# Patient Record
Sex: FEMALE | Race: WHITE | NOT HISPANIC OR LATINO | Employment: FULL TIME | URBAN - METROPOLITAN AREA
[De-identification: names, ages, dates, MRNs, and addresses within clinical notes are randomized per-mention and may not be internally consistent; named-entity substitution may affect disease eponyms.]

---

## 2020-02-17 ENCOUNTER — CONSULT (OUTPATIENT)
Dept: GASTROENTEROLOGY | Facility: CLINIC | Age: 35
End: 2020-02-17
Payer: COMMERCIAL

## 2020-02-17 VITALS
DIASTOLIC BLOOD PRESSURE: 88 MMHG | HEART RATE: 84 BPM | SYSTOLIC BLOOD PRESSURE: 128 MMHG | WEIGHT: 134 LBS | BODY MASS INDEX: 23.74 KG/M2 | HEIGHT: 63 IN

## 2020-02-17 DIAGNOSIS — K21.9 GASTROESOPHAGEAL REFLUX DISEASE, ESOPHAGITIS PRESENCE NOT SPECIFIED: ICD-10-CM

## 2020-02-17 DIAGNOSIS — R19.4 CHANGE IN BOWEL HABITS: Primary | ICD-10-CM

## 2020-02-17 DIAGNOSIS — K58.1 IRRITABLE BOWEL SYNDROME WITH CONSTIPATION: ICD-10-CM

## 2020-02-17 PROCEDURE — 99244 OFF/OP CNSLTJ NEW/EST MOD 40: CPT | Performed by: INTERNAL MEDICINE

## 2020-02-17 RX ORDER — DULOXETIN HYDROCHLORIDE 60 MG/1
1 CAPSULE, DELAYED RELEASE ORAL DAILY
COMMUNITY
Start: 2020-01-14 | End: 2020-04-27

## 2020-02-17 RX ORDER — ESCITALOPRAM OXALATE 10 MG/1
1 TABLET ORAL DAILY
COMMUNITY
Start: 2020-01-17

## 2020-02-17 RX ORDER — DICYCLOMINE HYDROCHLORIDE 10 MG/1
1 CAPSULE ORAL AS NEEDED
COMMUNITY
Start: 2020-01-24 | End: 2020-02-17

## 2020-02-17 RX ORDER — DICYCLOMINE HYDROCHLORIDE 10 MG/1
20 CAPSULE ORAL
Qty: 90 CAPSULE | Refills: 5 | Status: SHIPPED | OUTPATIENT
Start: 2020-02-17 | End: 2020-03-23

## 2020-02-17 RX ORDER — HYOSCYAMINE SULFATE 0.125 MG
0.12 TABLET ORAL EVERY 4 HOURS PRN
COMMUNITY

## 2020-02-17 RX ORDER — LANSOPRAZOLE 30 MG/1
30 CAPSULE, DELAYED RELEASE ORAL DAILY
COMMUNITY
End: 2020-04-27

## 2020-02-17 NOTE — PATIENT INSTRUCTIONS
[id number] mg 3 times a day  Continue Prevacid  Okay to use Levsin as needed  Blood work and stool studies as ordered    Repeat office visit 6-8 weeks

## 2020-02-17 NOTE — LETTER
February 17, 2020     Haile Manual, DO  [address] [facility] [address]    Patient: Veronica Cody   YOB: 1985   Date of Visit: 2/17/2020       Dear Dr Cristina Mon: Thank you for referring Starr Gong to me for evaluation  Below are my notes for this consultation  If you have questions, please do not hesitate to call me  I look forward to following your patient along with you  Sincerely,        Ap Christopher MD        CC: No Recipients  Ap Christopher MD  2/17/2020  1:58 PM  Sign at close encounter  [address] [facility] - Outpatient Consultation  Veronica Cody 37 y o  male MRN: [id number]  Encounter: [id number]          ASSESSMENT AND PLAN:      1  Change in bowel habits  2  Irritable bowel syndrome with constipation  Patient with chronic constipation predominant IBS who is now having diarrhea  Suspect secondary to Cymbalta but would like to continue this medication since this seems to be helping with her anxiety    - dicyclomine (BENTYL) 10 mg capsule; Take 2 capsules (20 mg total) by mouth 3 (three) times a day before meals  Dispense: 90 capsule; Refill: 5  - okay to use Levsin and Imodium as needed  - if no better we will need colonoscopy to exclude inflammatory bowel disease  - Calprotectin,Fecal; Future  - Clostridium difficile toxin by PCR with EIA; Future  - Giardia antigen; Future  - MISCELLANEOUS LAB TEST; Future  - Comprehensive metabolic panel; Future  - CBC; Future  - Sedimentation rate, automated; Future  - Fecal leukocytes; Future        3  Gastroesophageal reflux disease, esophagitis presence not specified  On lansoprazole    ______________________________________________________________________    HPI:  The patient is seen in the office today per the request of her PCP for evaluation of irritable bowel syndrome and change in bowel habits    She has long history of constipation predominant IBS and will move her bowels once every 1-2 weeks  About 3 months ago began having increased symptoms  She was seen by a colorectal surgeon started on MiraLax  She continued to take Ex-Lax as well  She reports beginning to have diarrhea at that point time  She reports that she is moving her bowels 3 to 4 times a day  They are anywhere from soft to watery  She denies any fecal incontinence but admits to some nocturnal stooling  She denies any rectal bleeding  She reports mild diffuse abdominal pain  She reports some upper GI symptoms  She continues take lansoprazole which helps  She denies any change in her weight  She reports around the time the symptoms started she was started on Cymbalta for her anxiety  She denies any change in her diet or stress level  She denies any travel, antibiotic use, or exposure to sick people  She was started on Bentyl 10 mg as needed which may be helping a little      REVIEW OF SYSTEMS:    CONSTITUTIONAL: Denies any fever, chills, rigors, and weight loss  HEENT: No earache or tinnitus  Denies hearing loss or visual disturbances  CARDIOVASCULAR: No chest pain or palpitations  RESPIRATORY: Denies any cough, hemoptysis, shortness of breath or dyspnea on exertion  GASTROINTESTINAL: As noted in the History of Present Illness  GENITOURINARY: No problems with urination  Denies any hematuria or dysuria  NEUROLOGIC: No dizziness or vertigo, denies headaches  MUSCULOSKELETAL: Denies any muscle or joint pain  SKIN: Denies skin rashes or itching  ENDOCRINE: Denies excessive thirst  Denies intolerance to heat or cold  PSYCHOSOCIAL:  Admits to some depression and anxiety  Denies any recent memory loss         Historical Information   Past Medical History:   Diagnosis Date    Anxiety     GERD (gastroesophageal reflux disease) 2/17/2020    Irritable bowel syndrome with constipation 2/17/2020     Past Surgical History:   Procedure Laterality Date    SINUS SURGERY      TONSILLECTOMY       Social History Social History     Substance and Sexual Activity   Alcohol Use Yes    Frequency: 2-4 times a month     Social History     Substance and Sexual Activity   Drug Use Not on file     Social History     Tobacco Use   Smoking Status Never Smoker   Smokeless Tobacco Never Used     Family History   Problem Relation Age of Onset    Irritable bowel syndrome Mother     Hypertension Mother     Hypertension Father     Colon cancer Maternal Grandfather     Colon polyps Neg Hx        Meds/Allergies       Current Outpatient Medications:     DULoxetine (CYMBALTA) 60 mg delayed release capsule    escitalopram (LEXAPRO) 10 mg tablet    hyoscyamine (ANASPAZ,LEVSIN) 0 125 MG tablet    lansoprazole (PREVACID) 30 mg capsule    dicyclomine (BENTYL) 10 mg capsule    Allergies   Allergen Reactions    Sulfa Antibiotics            Objective     Blood pressure 128/88, pulse 84, height 5' 3" (1 6 m), weight 60 8 kg (134 lb)  Body mass index is 23 74 kg/m²  PHYSICAL EXAM:      General Appearance:   Alert, cooperative, no distress   HEENT:   Normocephalic, atraumatic, anicteric      Neck:  Supple, symmetrical, trachea midline   Lungs:   Clear to auscultation bilaterally; no rales, rhonchi or wheezing; respirations unlabored    Heart[de-identified]   Regular rate and rhythm; no murmur, rub, or gallop     Abdomen:   Soft, mild right-sided abdominal tenderness without rebound or guarding r, non-distended; normal bowel sounds; no masses, no organomegaly    Genitalia:   Deferred    Rectal:   Deferred    Extremities:  No cyanosis, clubbing or edema    Pulses:  2+ and symmetric    Skin:  No jaundice, rashes, or lesions    Lymph nodes:  No palpable cervical lymphadenopathy        Lab Results:   None recently      Radiology Results:   Abdominal ultrasound normal  (3/23/19)

## 2020-02-17 NOTE — PROGRESS NOTES
[address] [facility] - Outpatient Consultation  Hilda Knight 37 y o  male MRN: [id number]  Encounter: [id number]          ASSESSMENT AND PLAN:      1  Change in bowel habits  2  Irritable bowel syndrome with constipation  Patient with chronic constipation predominant IBS who is now having diarrhea  Suspect secondary to Cymbalta but would like to continue this medication since this seems to be helping with her anxiety    - dicyclomine (BENTYL) 10 mg capsule; Take 2 capsules (20 mg total) by mouth 3 (three) times a day before meals  Dispense: 90 capsule; Refill: 5  - okay to use Levsin and Imodium as needed  - if no better we will need colonoscopy to exclude inflammatory bowel disease  - Calprotectin,Fecal; Future  - Clostridium difficile toxin by PCR with EIA; Future  - Giardia antigen; Future  - MISCELLANEOUS LAB TEST; Future  - Comprehensive metabolic panel; Future  - CBC; Future  - Sedimentation rate, automated; Future  - Fecal leukocytes; Future        3  Gastroesophageal reflux disease, esophagitis presence not specified  On lansoprazole    ______________________________________________________________________    HPI:  The patient is seen in the office today per the request of her PCP for evaluation of irritable bowel syndrome and change in bowel habits  She has long history of constipation predominant IBS and will move her bowels once every 1-2 weeks  About 3 months ago began having increased symptoms  She was seen by a colorectal surgeon started on MiraLax  She continued to take Ex-Lax as well  She reports beginning to have diarrhea at that point time  She reports that she is moving her bowels 3 to 4 times a day  They are anywhere from soft to watery  She denies any fecal incontinence but admits to some nocturnal stooling  She denies any rectal bleeding  She reports mild diffuse abdominal pain  She reports some upper GI symptoms    She continues take lansoprazole which helps  She denies any change in her weight  She reports around the time the symptoms started she was started on Cymbalta for her anxiety  She denies any change in her diet or stress level  She denies any travel, antibiotic use, or exposure to sick people  She was started on Bentyl 10 mg as needed which may be helping a little      REVIEW OF SYSTEMS:    CONSTITUTIONAL: Denies any fever, chills, rigors, and weight loss  HEENT: No earache or tinnitus  Denies hearing loss or visual disturbances  CARDIOVASCULAR: No chest pain or palpitations  RESPIRATORY: Denies any cough, hemoptysis, shortness of breath or dyspnea on exertion  GASTROINTESTINAL: As noted in the History of Present Illness  GENITOURINARY: No problems with urination  Denies any hematuria or dysuria  NEUROLOGIC: No dizziness or vertigo, denies headaches  MUSCULOSKELETAL: Denies any muscle or joint pain  SKIN: Denies skin rashes or itching  ENDOCRINE: Denies excessive thirst  Denies intolerance to heat or cold  PSYCHOSOCIAL:  Admits to some depression and anxiety  Denies any recent memory loss         Historical Information   Past Medical History:   Diagnosis Date    Anxiety     GERD (gastroesophageal reflux disease) 2/17/2020    Irritable bowel syndrome with constipation 2/17/2020     Past Surgical History:   Procedure Laterality Date    SINUS SURGERY      TONSILLECTOMY       Social History   Social History     Substance and Sexual Activity   Alcohol Use Yes    Frequency: 2-4 times a month     Social History     Substance and Sexual Activity   Drug Use Not on file     Social History     Tobacco Use   Smoking Status Never Smoker   Smokeless Tobacco Never Used     Family History   Problem Relation Age of Onset    Irritable bowel syndrome Mother     Hypertension Mother     Hypertension Father     Colon cancer Maternal Grandfather     Colon polyps Neg Hx        Meds/Allergies       Current Outpatient Medications:    DULoxetine (CYMBALTA) 60 mg delayed release capsule    escitalopram (LEXAPRO) 10 mg tablet    hyoscyamine (ANASPAZ,LEVSIN) 0 125 MG tablet    lansoprazole (PREVACID) 30 mg capsule    dicyclomine (BENTYL) 10 mg capsule    Allergies   Allergen Reactions    Sulfa Antibiotics            Objective     Blood pressure 128/88, pulse 84, height 5' 3" (1 6 m), weight 60 8 kg (134 lb)  Body mass index is 23 74 kg/m²  PHYSICAL EXAM:      General Appearance:   Alert, cooperative, no distress   HEENT:   Normocephalic, atraumatic, anicteric      Neck:  Supple, symmetrical, trachea midline   Lungs:   Clear to auscultation bilaterally; no rales, rhonchi or wheezing; respirations unlabored    Heart[de-identified]   Regular rate and rhythm; no murmur, rub, or gallop     Abdomen:   Soft, mild right-sided abdominal tenderness without rebound or guarding r, non-distended; normal bowel sounds; no masses, no organomegaly    Genitalia:   Deferred    Rectal:   Deferred    Extremities:  No cyanosis, clubbing or edema    Pulses:  2+ and symmetric    Skin:  No jaundice, rashes, or lesions    Lymph nodes:  No palpable cervical lymphadenopathy        Lab Results:   None recently      Radiology Results:   Abdominal ultrasound normal  (3/23/19)

## 2020-02-23 LAB
ALBUMIN SERPL-MCNC: 4 G/DL (ref 3.8–4.8)
ALBUMIN/GLOB SERPL: 1.2 {RATIO} (ref 1.2–2.2)
ALP SERPL-CCNC: 59 IU/L (ref 39–117)
ALT SERPL-CCNC: 12 IU/L (ref 0–32)
AST SERPL-CCNC: 13 IU/L (ref 0–40)
BASOPHILS # BLD AUTO: 0.1 X10E3/UL (ref 0–0.2)
BASOPHILS NFR BLD AUTO: 1 %
BILIRUB SERPL-MCNC: 0.2 MG/DL (ref 0–1.2)
BUN SERPL-MCNC: 8 MG/DL (ref 6–20)
BUN/CREAT SERPL: 10 (ref 9–23)
CALCIUM SERPL-MCNC: 9.2 MG/DL (ref 8.7–10.2)
CHLORIDE SERPL-SCNC: 101 MMOL/L (ref 96–106)
CO2 SERPL-SCNC: 23 MMOL/L (ref 20–29)
CREAT SERPL-MCNC: 0.82 MG/DL (ref 0.57–1)
EOSINOPHIL # BLD AUTO: 0.2 X10E3/UL (ref 0–0.4)
EOSINOPHIL NFR BLD AUTO: 3 %
ERYTHROCYTE [DISTWIDTH] IN BLOOD BY AUTOMATED COUNT: 13.1 % (ref 11.7–15.4)
ERYTHROCYTE [SEDIMENTATION RATE] IN BLOOD BY WESTERGREN METHOD: 19 MM/HR (ref 0–32)
GLOBULIN SER-MCNC: 3.4 G/DL (ref 1.5–4.5)
GLUCOSE SERPL-MCNC: 76 MG/DL (ref 65–99)
HCT VFR BLD AUTO: 40.6 % (ref 34–46.6)
HGB BLD-MCNC: 14 G/DL (ref 11.1–15.9)
IMM GRANULOCYTES # BLD: 0 X10E3/UL (ref 0–0.1)
IMM GRANULOCYTES NFR BLD: 0 %
LYMPHOCYTES # BLD AUTO: 1.5 X10E3/UL (ref 0.7–3.1)
LYMPHOCYTES NFR BLD AUTO: 23 %
MCH RBC QN AUTO: 32.7 PG (ref 26.6–33)
MCHC RBC AUTO-ENTMCNC: 34.5 G/DL (ref 31.5–35.7)
MCV RBC AUTO: 95 FL (ref 79–97)
MONOCYTES # BLD AUTO: 0.5 X10E3/UL (ref 0.1–0.9)
MONOCYTES NFR BLD AUTO: 8 %
NEUTROPHILS # BLD AUTO: 4.3 X10E3/UL (ref 1.4–7)
NEUTROPHILS NFR BLD AUTO: 65 %
PLATELET # BLD AUTO: 316 X10E3/UL (ref 150–450)
POTASSIUM SERPL-SCNC: 4.4 MMOL/L (ref 3.5–5.2)
PROT SERPL-MCNC: 7.4 G/DL (ref 6–8.5)
RBC # BLD AUTO: 4.28 X10E6/UL (ref 3.77–5.28)
SL AMB EGFR AFRICAN AMERICAN: 106 ML/MIN/1.73
SL AMB EGFR NON AFRICAN AMERICAN: 92 ML/MIN/1.73
SODIUM SERPL-SCNC: 138 MMOL/L (ref 134–144)
WBC # BLD AUTO: 6.6 X10E3/UL (ref 3.4–10.8)

## 2020-02-26 LAB
C DIFF TOX A+B STL QL IA: NEGATIVE
CALPROTECTIN STL-MCNT: 57 UG/G (ref 0–120)
G LAMBLIA AG STL QL IA: NEGATIVE
Lab: ABNORMAL
WBC SPEC QL GRAM STN: ABNORMAL

## 2020-02-29 LAB
CAMPYLOBACTER STL CULT: NORMAL
Lab: NORMAL
Lab: NORMAL
SALM + SHIG STL CULT: NORMAL
SL AMB E COLI SHIGA TOXIN EIA: NEGATIVE

## 2020-03-23 DIAGNOSIS — R19.4 CHANGE IN BOWEL HABITS: ICD-10-CM

## 2020-03-23 RX ORDER — DICYCLOMINE HYDROCHLORIDE 10 MG/1
20 CAPSULE ORAL
Qty: 90 CAPSULE | Refills: 5 | Status: SHIPPED | OUTPATIENT
Start: 2020-03-23 | End: 2020-04-27 | Stop reason: SDUPTHER

## 2020-04-27 ENCOUNTER — TELEMEDICINE (OUTPATIENT)
Dept: GASTROENTEROLOGY | Facility: CLINIC | Age: 35
End: 2020-04-27
Payer: COMMERCIAL

## 2020-04-27 VITALS — WEIGHT: 129 LBS | BODY MASS INDEX: 22.86 KG/M2 | HEIGHT: 63 IN

## 2020-04-27 DIAGNOSIS — F41.9 ANXIETY: ICD-10-CM

## 2020-04-27 DIAGNOSIS — Z12.11 COLON CANCER SCREENING: ICD-10-CM

## 2020-04-27 DIAGNOSIS — K21.9 GASTROESOPHAGEAL REFLUX DISEASE, ESOPHAGITIS PRESENCE NOT SPECIFIED: ICD-10-CM

## 2020-04-27 DIAGNOSIS — R14.0 BLOATING: ICD-10-CM

## 2020-04-27 DIAGNOSIS — K58.1 IRRITABLE BOWEL SYNDROME WITH CONSTIPATION: Primary | ICD-10-CM

## 2020-04-27 PROCEDURE — 99213 OFFICE O/P EST LOW 20 MIN: CPT | Performed by: INTERNAL MEDICINE

## 2020-04-27 RX ORDER — POLYETHYLENE GLYCOL 3350 17 G/17G
17 POWDER, FOR SOLUTION ORAL DAILY
Qty: 507 G | Refills: 3 | Status: SHIPPED | OUTPATIENT
Start: 2020-04-27

## 2020-04-27 RX ORDER — DICYCLOMINE HYDROCHLORIDE 10 MG/1
20 CAPSULE ORAL
Qty: 90 CAPSULE | Refills: 5
Start: 2020-04-27

## 2020-04-27 RX ORDER — PANTOPRAZOLE SODIUM 40 MG/1
40 TABLET, DELAYED RELEASE ORAL DAILY
Start: 2020-04-27

## 2020-04-27 RX ORDER — PANTOPRAZOLE SODIUM 40 MG/1
40 TABLET, DELAYED RELEASE ORAL DAILY
COMMUNITY
End: 2020-04-27 | Stop reason: SDUPTHER

## 2021-03-26 DIAGNOSIS — Z23 ENCOUNTER FOR IMMUNIZATION: ICD-10-CM
